# Patient Record
(demographics unavailable — no encounter records)

---

## 2025-06-10 NOTE — PLAN
[FreeTextEntry1] : [facility] not due for pap ref for mammo given pt current wtih colonoscopy did last year and was neg n/v 1 yr or PRN

## 2025-06-10 NOTE — HISTORY OF PRESENT ILLNESS
[FreeTextEntry1] : 65 yo here for [facility] last mammo last yr had DXA then as well, osteopenia but the scores had improved slightly no complaints

## 2025-06-10 NOTE — PHYSICAL EXAM
[Chaperoned Physical Exam] : A chaperone was present in the examining room during all aspects of the physical examination. [MA] : MA [Soft] : soft [Non-tender] : non-tender [No Mass] : no mass [Non-distended] : non-distended [Oriented x3] : oriented x3 [Examination Of The Breasts] : a normal appearance [No Masses] : no breast masses were palpable [Labia Majora] : normal [Labia Minora] : normal [Normal] : normal [Uterine Adnexae] : normal